# Patient Record
Sex: MALE | Race: BLACK OR AFRICAN AMERICAN
[De-identification: names, ages, dates, MRNs, and addresses within clinical notes are randomized per-mention and may not be internally consistent; named-entity substitution may affect disease eponyms.]

---

## 2017-02-01 ENCOUNTER — HOSPITAL ENCOUNTER (EMERGENCY)
Dept: HOSPITAL 47 - EC | Age: 22
Discharge: HOME | End: 2017-02-01
Payer: COMMERCIAL

## 2017-02-01 VITALS
RESPIRATION RATE: 18 BRPM | HEART RATE: 60 BPM | DIASTOLIC BLOOD PRESSURE: 64 MMHG | SYSTOLIC BLOOD PRESSURE: 110 MMHG | TEMPERATURE: 97.4 F

## 2017-02-01 DIAGNOSIS — T42.6X1A: Primary | ICD-10-CM

## 2017-02-01 LAB
ALP SERPL-CCNC: 64 U/L (ref 38–126)
ALT SERPL-CCNC: 42 U/L (ref 21–72)
ANION GAP SERPL CALC-SCNC: 11 MMOL/L
APAP SPEC-MCNC: <10 UG/ML
AST SERPL-CCNC: 29 U/L (ref 17–59)
BASOPHILS # BLD AUTO: 0 K/UL (ref 0–0.2)
BASOPHILS NFR BLD AUTO: 1 %
BUN SERPL-SCNC: 10 MG/DL (ref 9–20)
CALCIUM SPEC-MCNC: 9.3 MG/DL (ref 8.4–10.2)
CH: 29.9
CHCM: 34.2
CHLORIDE SERPL-SCNC: 106 MMOL/L (ref 98–107)
CK SERPL-CCNC: 165 U/L (ref 55–170)
CO2 SERPL-SCNC: 22 MMOL/L (ref 22–30)
EOSINOPHIL # BLD AUTO: 0 K/UL (ref 0–0.7)
EOSINOPHIL NFR BLD AUTO: 0 %
ERYTHROCYTE [DISTWIDTH] IN BLOOD BY AUTOMATED COUNT: 4.87 M/UL (ref 4.3–5.9)
ERYTHROCYTE [DISTWIDTH] IN BLOOD: 13.2 % (ref 11.5–15.5)
GLUCOSE SERPL-MCNC: 93 MG/DL (ref 74–99)
HCT VFR BLD AUTO: 42.7 % (ref 39–53)
HDW: 2.6
HGB BLD-MCNC: 14.2 GM/DL (ref 13–17.5)
LUC NFR BLD AUTO: 3 %
LYMPHOCYTES # SPEC AUTO: 1.1 K/UL (ref 1–4.8)
LYMPHOCYTES NFR SPEC AUTO: 16 %
MCH RBC QN AUTO: 29.2 PG (ref 25–35)
MCHC RBC AUTO-ENTMCNC: 33.3 G/DL (ref 31–37)
MCV RBC AUTO: 87.7 FL (ref 80–100)
MONOCYTES # BLD AUTO: 0.5 K/UL (ref 0–1)
MONOCYTES NFR BLD AUTO: 7 %
NEUTROPHILS # BLD AUTO: 5.3 K/UL (ref 1.3–7.7)
NEUTROPHILS NFR BLD AUTO: 74 %
NON-AFRICAN AMERICAN GFR(MDRD): >60
POTASSIUM SERPL-SCNC: 4.5 MMOL/L (ref 3.5–5.1)
PROT SERPL-MCNC: 7.4 G/DL (ref 6.3–8.2)
SALICYLATES SERPL-MCNC: <1 MG/DL
SODIUM SERPL-SCNC: 139 MMOL/L (ref 137–145)
TROPONIN I SERPL-MCNC: <0.012 NG/ML (ref 0–0.03)
WBC # BLD AUTO: 0.24 10*3/UL
WBC # BLD AUTO: 7.2 K/UL (ref 4–11)
WBC (PEROX): 7.19

## 2017-02-01 PROCEDURE — 85025 COMPLETE CBC W/AUTO DIFF WBC: CPT

## 2017-02-01 PROCEDURE — 82075 ASSAY OF BREATH ETHANOL: CPT

## 2017-02-01 PROCEDURE — 84484 ASSAY OF TROPONIN QUANT: CPT

## 2017-02-01 PROCEDURE — 99285 EMERGENCY DEPT VISIT HI MDM: CPT

## 2017-02-01 PROCEDURE — 36415 COLL VENOUS BLD VENIPUNCTURE: CPT

## 2017-02-01 PROCEDURE — 93005 ELECTROCARDIOGRAM TRACING: CPT

## 2017-02-01 PROCEDURE — 80053 COMPREHEN METABOLIC PANEL: CPT

## 2017-02-01 PROCEDURE — 83520 IMMUNOASSAY QUANT NOS NONAB: CPT

## 2017-02-01 PROCEDURE — 96360 HYDRATION IV INFUSION INIT: CPT

## 2017-02-01 PROCEDURE — 80306 DRUG TEST PRSMV INSTRMNT: CPT

## 2017-02-01 PROCEDURE — 82553 CREATINE MB FRACTION: CPT

## 2017-02-01 PROCEDURE — 82550 ASSAY OF CK (CPK): CPT

## 2017-02-01 NOTE — ED
General Adult HPI





- General


Stated complaint: unresponsive


Time Seen by Provider: 02/01/17 02:25


Source: RN notes reviewed





- History of Present Illness


Initial comments: 





This is a 21-year-old male who is brought in by EMS with police accompaniment.  

Patient's mother states she brought him home from a friend's house he was on a 

couch unresponsive.  When EMS arrived the patient's pupils were pinpoint and it 

took 8 mg of Narcan to wake the patient up.  Patient states she only took 2 

gabapentin and he denies any narcotic use.  Patient denies any headache patient 

denies numbness weakness.  Patient denies any chest pain difficulty breathing 

or shortness of breath.  Patient denies abdominal pain patient denies nausea 

vomiting diarrhea.  Patient denies any alcohol patient denies any drug use.  

Patient denies any recent injury or trauma.





- Related Data


 Home Medications











 Medication  Instructions  Recorded  Confirmed


 


No Known Home Medications [No  02/01/17 02/01/17





Known Home Medications]   











 Allergies











Allergy/AdvReac Type Severity Reaction Status Date / Time


 


No Known Allergies Allergy   Verified 02/01/17 02:36














Review of Systems


ROS Statement: 


Those systems with pertinent positive or pertinent negative responses have been 

documented in the HPI.





ROS Other: All systems not noted in ROS Statement are negative.





Past Medical History


Past Medical History: No Reported History


History of Any Multi-Drug Resistant Organisms: None Reported


Past Surgical History: Appendectomy


Past Psychological History: ADD/ADHD


Smoking Status: Never smoker


Past Alcohol Use History: None Reported


Past Drug Use History: None Reported





General Exam





- General Exam Comments


Initial Comments: 





GENERAL:


Patient is well-developed and well-nourished.  Patient is nontoxic and well-

hydrated and is in no acute distress.  Patient is slow to answer questions but 

he is able to answer all questions and his voice is clear





ENT:


Neck is soft and supple.  No significant lymphadenopathy is noted.  Oropharynx 

is clear.  Moist mucous membranes.  Neck has full range of motion without 

eliciting any pain. 





EYES:


The sclera were anicteric and conjunctiva were pink and moist.  Extraocular 

movements were intact and pupils were equal round and reactive to light.  

Eyelids were unremarkable.





PULMONARY:


Unlabored respirations.  Good breath sounds bilaterally.  No audible rales 

rhonchi or wheezing was noted.





CARDIOVASCULAR:


There is a regular rate and rhythm without any murmurs gallops or rubs.  





ABDOMEN:


Soft and nontender with normal bowel sounds.  No palpable organomegaly was 

noted.  There is no palpable pulsatile mass.





SKIN:


Skin is clear with no lesions or rashes and otherwise unremarkable.





NEUROLOGIC:


Patient is alert and oriented x3.  Cranial nerves II through XII are grossly 

intact.  Motor and sensory are also intact.  Normal speech, volume and content.

  Symmetrical smile. 





MUSCULOSKELETAL:


Normal extremities with adequate strength and full range of motion. .





LYMPHATICS:


No significant lymphadenopathy is noted





PSYCHIATRIC:


Normal psychiatric evaluation.  





Course


 Vital Signs











  02/01/17 02/01/17 02/01/17





  02:34 03:37 04:59


 


Temperature 98 F  


 


Pulse Rate 91 65 52 L


 


Respiratory 18 22 20





Rate   


 


Blood Pressure 151/92 145/89 107/51


 


O2 Sat by Pulse 98 96 96





Oximetry   














  02/01/17





  05:56


 


Temperature 97.4 F L


 


Pulse Rate 60


 


Respiratory 18





Rate 


 


Blood Pressure 110/64


 


O2 Sat by Pulse 96





Oximetry 














Medical Decision Making





- Medical Decision Making





EKG shows a sinus rhythm with a first-degree AV block at 74 bpm HI interval is 2

:30 QRS is 114 QT interval 384 QTC is 426 but patient has some ST segment 

elevation in leads 2 and aVF as well as precordial leads V2 through V6 all of 

which could be repolarization but because he cannot be 100% sure we will do 

some cardiac enzymes





Patient's friends came to the bedside and were stating that they all were 

smoking marijuana no one else had this affect.  They state that he has been 

known to take a few extra gabapentin to try to get high when I stated that he 

only took 2 they did not believe that they thought he probably took a few more 

than that.  They state that he was in a good mood and he never mentioned 

anything about wanting to harm himself or wanting to commit suicide patient 

denied that earlier when I spoke with.





Patient woke up he was alert and oriented 3 he stated he did take a few 

Neurontin.  Patient denies any other drug use.  Family came to pick the patient





- Lab Data


Result diagrams: 


 02/01/17 02:45





 02/01/17 02:45


 Lab Results











  02/01/17 02/01/17 02/01/17 Range/Units





  02:45 02:45 02:45 


 


WBC    7.2  (4.0-11.0)  k/uL


 


RBC    4.87  (4.30-5.90)  m/uL


 


Hgb    14.2  (13.0-17.5)  gm/dL


 


Hct    42.7  (39.0-53.0)  %


 


MCV    87.7  (80.0-100.0)  fL


 


MCH    29.2  (25.0-35.0)  pg


 


MCHC    33.3  (31.0-37.0)  g/dL


 


RDW    13.2  (11.5-15.5)  %


 


Plt Count    205  (150-450)  k/uL


 


Neutrophils %    74  %


 


Lymphocytes %    16  %


 


Monocytes %    7  %


 


Eosinophils %    0  %


 


Basophils %    1  %


 


Neutrophils #    5.3  (1.3-7.7)  k/uL


 


Lymphocytes #    1.1  (1.0-4.8)  k/uL


 


Monocytes #    0.5  (0-1.0)  k/uL


 


Eosinophils #    0.0  (0-0.7)  k/uL


 


Basophils #    0.0  (0-0.2)  k/uL


 


Sodium  139    (137-145)  mmol/L


 


Potassium  4.5    (3.5-5.1)  mmol/L


 


Chloride  106    ()  mmol/L


 


Carbon Dioxide  22    (22-30)  mmol/L


 


Anion Gap  11    mmol/L


 


BUN  10    (9-20)  mg/dL


 


Creatinine  0.80    (0.66-1.25)  mg/dL


 


Est GFR (MDRD) Af Amer  >60    (>60 ml/min/1.73 sqM)  


 


Est GFR (MDRD) Non-Af  >60    (>60 ml/min/1.73 sqM)  


 


Glucose  93    (74-99)  mg/dL


 


Calcium  9.3    (8.4-10.2)  mg/dL


 


Total Bilirubin  0.5    (0.2-1.3)  mg/dL


 


AST  29    (17-59)  U/L


 


ALT  42    (21-72)  U/L


 


Alkaline Phosphatase  64    ()  U/L


 


Total Creatine Kinase   165   ()  U/L


 


CK-MB (CK-2)   0.3   (0.0-2.4)  ng/mL


 


CK-MB (CK-2) Rel Index   0.2   


 


Troponin I   <0.012   (0.000-0.034)  ng/mL


 


Total Protein  7.4    (6.3-8.2)  g/dL


 


Albumin  4.4    (3.5-5.0)  g/dL


 


Salicylates  <1.0    mg/dL


 


Urine Opiates Screen     (NotDetected)  


 


Ur Oxycodone Screen     (NotDetected)  


 


Urine Methadone Screen     (NotDetected)  


 


Ur Propoxyphene Screen     (NotDetected)  


 


Acetaminophen  <10.0    ug/mL


 


Ur Barbiturates Screen     (NotDetected)  


 


U Tricyclic Antidepress     (NotDetected)  


 


Ur Phencyclidine Scrn     (NotDetected)  


 


Ur Amphetamines Screen     (NotDetected)  


 


U Methamphetamines Scrn     (NotDetected)  


 


U Benzodiazepines Scrn     (NotDetected)  


 


Urine Cocaine Screen     (NotDetected)  


 


U Marijuana (THC) Screen     (NotDetected)  














  02/01/17 Range/Units





  02:45 


 


WBC   (4.0-11.0)  k/uL


 


RBC   (4.30-5.90)  m/uL


 


Hgb   (13.0-17.5)  gm/dL


 


Hct   (39.0-53.0)  %


 


MCV   (80.0-100.0)  fL


 


MCH   (25.0-35.0)  pg


 


MCHC   (31.0-37.0)  g/dL


 


RDW   (11.5-15.5)  %


 


Plt Count   (150-450)  k/uL


 


Neutrophils %   %


 


Lymphocytes %   %


 


Monocytes %   %


 


Eosinophils %   %


 


Basophils %   %


 


Neutrophils #   (1.3-7.7)  k/uL


 


Lymphocytes #   (1.0-4.8)  k/uL


 


Monocytes #   (0-1.0)  k/uL


 


Eosinophils #   (0-0.7)  k/uL


 


Basophils #   (0-0.2)  k/uL


 


Sodium   (137-145)  mmol/L


 


Potassium   (3.5-5.1)  mmol/L


 


Chloride   ()  mmol/L


 


Carbon Dioxide   (22-30)  mmol/L


 


Anion Gap   mmol/L


 


BUN   (9-20)  mg/dL


 


Creatinine   (0.66-1.25)  mg/dL


 


Est GFR (MDRD) Af Amer   (>60 ml/min/1.73 sqM)  


 


Est GFR (MDRD) Non-Af   (>60 ml/min/1.73 sqM)  


 


Glucose   (74-99)  mg/dL


 


Calcium   (8.4-10.2)  mg/dL


 


Total Bilirubin   (0.2-1.3)  mg/dL


 


AST   (17-59)  U/L


 


ALT   (21-72)  U/L


 


Alkaline Phosphatase   ()  U/L


 


Total Creatine Kinase   ()  U/L


 


CK-MB (CK-2)   (0.0-2.4)  ng/mL


 


CK-MB (CK-2) Rel Index   


 


Troponin I   (0.000-0.034)  ng/mL


 


Total Protein   (6.3-8.2)  g/dL


 


Albumin   (3.5-5.0)  g/dL


 


Salicylates   mg/dL


 


Urine Opiates Screen  Not Detected  (NotDetected)  


 


Ur Oxycodone Screen  Not Detected  (NotDetected)  


 


Urine Methadone Screen  Not Detected  (NotDetected)  


 


Ur Propoxyphene Screen  Not Detected  (NotDetected)  


 


Acetaminophen   ug/mL


 


Ur Barbiturates Screen  Not Detected  (NotDetected)  


 


U Tricyclic Antidepress  Not Detected  (NotDetected)  


 


Ur Phencyclidine Scrn  Not Detected  (NotDetected)  


 


Ur Amphetamines Screen  Not Detected  (NotDetected)  


 


U Methamphetamines Scrn  Not Detected  (NotDetected)  


 


U Benzodiazepines Scrn  Not Detected  (NotDetected)  


 


Urine Cocaine Screen  Not Detected  (NotDetected)  


 


U Marijuana (THC) Screen  Detected H  (NotDetected)  














Disposition


Clinical Impression: 


 Gabapentin overdose





Disposition: HOME SELF-CARE


Condition: Good


Instructions:  Adult Overdose (ED)


Referrals: 


Edward Ambrose MD [Primary Care Provider] - 1-2 days


Time of Disposition: 06:09

## 2020-01-21 ENCOUNTER — HOSPITAL ENCOUNTER (EMERGENCY)
Dept: HOSPITAL 47 - EC | Age: 25
Discharge: HOME | End: 2020-01-21
Payer: COMMERCIAL

## 2020-01-21 VITALS
DIASTOLIC BLOOD PRESSURE: 74 MMHG | SYSTOLIC BLOOD PRESSURE: 138 MMHG | HEART RATE: 68 BPM | TEMPERATURE: 99.7 F | RESPIRATION RATE: 19 BRPM

## 2020-01-21 DIAGNOSIS — J40: Primary | ICD-10-CM

## 2020-01-21 DIAGNOSIS — F17.200: ICD-10-CM

## 2020-01-21 DIAGNOSIS — J06.9: ICD-10-CM

## 2020-01-21 PROCEDURE — 87502 INFLUENZA DNA AMP PROBE: CPT

## 2020-01-21 PROCEDURE — 99285 EMERGENCY DEPT VISIT HI MDM: CPT

## 2020-01-21 PROCEDURE — 71046 X-RAY EXAM CHEST 2 VIEWS: CPT

## 2020-01-21 NOTE — ED
URI HPI





- General


Chief Complaint: Upper Respiratory Infection


Stated Complaint: ANGELY


Time Seen by Provider: 01/21/20 14:09


Source: patient, RN notes reviewed


Mode of arrival: ambulatory


Limitations: no limitations





- History of Present Illness


Initial Comments: 


24-year-old male presents emergency Department with chief complaint of fever 

cough congestion.  Patient has been sick last few days.  Patient states she is 

achy had a telemetry complaint of headache, congestion nonproductive cough.  He 

has no history of asthma or COPD.  Denies any nausea vomiting diarrhea 

constipation no sick contacts.  Patient has NO KNOWN DRUG ALLERGIES.








- Related Data


                                  Previous Rx's











 Medication  Instructions  Recorded


 


Azithromycin [Zithromax Z-pack] 0 mg PO AS DIRECTED #1 pack 01/21/20











                                    Allergies











Allergy/AdvReac Type Severity Reaction Status Date / Time


 


No Known Allergies Allergy   Verified 02/01/17 02:36














Review of Systems


ROS Statement: 


Those systems with pertinent positive or pertinent negative responses have been 

documented in the HPI.





ROS Other: All systems not noted in ROS Statement are negative.





Past Medical History


Past Medical History: No Reported History


History of Any Multi-Drug Resistant Organisms: None Reported


Past Surgical History: Appendectomy


Past Psychological History: ADD/ADHD


Smoking Status: Current every day smoker


Past Alcohol Use History: Occasional


Past Drug Use History: None Reported





General Exam


Limitations: no limitations


General appearance: alert, in no apparent distress


Head exam: Present: atraumatic, normocephalic, normal inspection


Eye exam: Present: normal appearance, PERRL, EOMI.  Absent: scleral icterus, 

conjunctival injection, periorbital swelling


ENT exam: Present: normal exam, mucous membranes moist


Neck exam: Present: normal inspection, full ROM.  Absent: tenderness, 

meningismus, lymphadenopathy


Respiratory exam: Present: normal lung sounds bilaterally.  Absent: respiratory 

distress, wheezes, rales, rhonchi, stridor


Cardiovascular Exam: Present: regular rate, normal rhythm, normal heart sounds. 

Absent: systolic murmur, diastolic murmur, rubs, gallop, clicks


GI/Abdominal exam: Present: soft, normal bowel sounds.  Absent: distended, 

tenderness, guarding, rebound, rigid


Neurological exam: Present: alert, oriented X3, CN II-XII intact


Skin exam: Present: warm, dry, intact, normal color.  Absent: rash





Course


                                   Vital Signs











  01/21/20





  13:49


 


Temperature 99.7 F H


 


Pulse Rate 68


 


Respiratory 19





Rate 


 


Blood Pressure 138/74


 


O2 Sat by Pulse 97





Oximetry 














Medical Decision Making





- Medical Decision Making





X-rays unremarkable, influenza is negative at this time this may be viral 

nature, though there is concerns for possible early pneumonia.  Patient was 

placed on antibiotics.  Return parameters were discussed.





- Lab Data


                                   Lab Results











  01/21/20 Range/Units





  14:25 


 


Influenza Type A RNA  Not Detected  (Not Detectd)  


 


Influenza Type B (PCR)  Not Detected  (Not Detectd)  














Disposition


Clinical Impression: 


 Acute upper respiratory infection, Bronchitis





Disposition: HOME SELF-CARE


Condition: Stable


Instructions (If sedation given, give patient instructions):  Upper Respiratory 

Infection (ED)


Additional Instructions: 


Please return to the Emergency Department if symptoms worsen or any other 

concerns.


Prescriptions: 


Azithromycin [Zithromax Z-pack] 0 mg PO AS DIRECTED #1 pack


Is patient prescribed a controlled substance at d/c from ED?: No


Referrals: 


None,Stated [Primary Care Provider] - 1-2 days


Time of Disposition: 14:58

## 2020-01-21 NOTE — XR
EXAMINATION TYPE: XR chest 2V

 

DATE OF EXAM: 1/21/2020

 

COMPARISON: NONE

 

TECHNIQUE: PA and lateral views submitted.

 

HISTORY: Fever

 

FINDINGS:

The lungs are clear and  there is no pneumothorax, pleural effusion, or focal pneumonia.  Heart size 
normal and no overt failure.

 

IMPRESSION: 

1. No acute process.

## 2021-12-24 ENCOUNTER — HOSPITAL ENCOUNTER (EMERGENCY)
Dept: HOSPITAL 47 - EC | Age: 26
Discharge: HOME | End: 2021-12-24
Payer: COMMERCIAL

## 2021-12-24 VITALS
HEART RATE: 124 BPM | DIASTOLIC BLOOD PRESSURE: 110 MMHG | TEMPERATURE: 97.9 F | RESPIRATION RATE: 18 BRPM | SYSTOLIC BLOOD PRESSURE: 157 MMHG

## 2021-12-24 DIAGNOSIS — S61.412A: Primary | ICD-10-CM
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
## 2021-12-24 PROCEDURE — 99282 EMERGENCY DEPT VISIT SF MDM: CPT

## 2021-12-24 RX ADMIN — TETANUS TOXOID, REDUCED DIPHTHERIA TOXOID AND ACELLULAR PERTUSSIS VACCINE, ADSORBED ONE: 5; 2.5; 8; 8; 2.5 SUSPENSION INTRAMUSCULAR at 01:35

## 2021-12-24 RX ADMIN — GELATIN ABSORBABLE SPONGE SIZE 100 STA EACH: MISC at 02:17
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
## 2022-02-28 ENCOUNTER — HOSPITAL ENCOUNTER (EMERGENCY)
Dept: HOSPITAL 47 - EC | Age: 27
Discharge: HOME | End: 2022-02-28
Payer: COMMERCIAL

## 2022-02-28 VITALS — TEMPERATURE: 97.8 F

## 2022-02-28 VITALS — SYSTOLIC BLOOD PRESSURE: 138 MMHG | DIASTOLIC BLOOD PRESSURE: 88 MMHG | RESPIRATION RATE: 24 BRPM | HEART RATE: 79 BPM

## 2022-02-28 DIAGNOSIS — F90.9: ICD-10-CM
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
## 2022-02-28 DIAGNOSIS — S60.221A: Primary | ICD-10-CM

## 2022-02-28 DIAGNOSIS — Z90.49: ICD-10-CM

## 2022-02-28 PROCEDURE — 99283 EMERGENCY DEPT VISIT LOW MDM: CPT

## 2022-02-28 NOTE — XR
EXAMINATION TYPE: XR hand complete RT

 

DATE OF EXAM: 2/28/2022

 

COMPARISON: NONE

 

HISTORY: Pain

 

TECHNIQUE: 3 views

 

FINDINGS: There is soft tissue swelling on the dorsum of the hand. The metacarpals are intact. I see 
no fracture nor dislocation. The fingers are intact.

 

IMPRESSION: Soft tissue swelling. No fracture.
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
## 2022-07-01 ENCOUNTER — HOSPITAL ENCOUNTER (OUTPATIENT)
Dept: HOSPITAL 47 - ORWHC2ENDO | Age: 27
Discharge: HOME | End: 2022-07-01
Attending: INTERNAL MEDICINE
Payer: COMMERCIAL

## 2022-07-01 VITALS — RESPIRATION RATE: 16 BRPM | TEMPERATURE: 98.1 F

## 2022-07-01 VITALS — SYSTOLIC BLOOD PRESSURE: 119 MMHG | DIASTOLIC BLOOD PRESSURE: 73 MMHG | HEART RATE: 59 BPM

## 2022-07-01 DIAGNOSIS — I25.10: ICD-10-CM

## 2022-07-01 DIAGNOSIS — K21.9: ICD-10-CM

## 2022-07-01 DIAGNOSIS — K29.50: Primary | ICD-10-CM

## 2022-07-01 DIAGNOSIS — Z79.899: ICD-10-CM

## 2022-07-01 PROCEDURE — 43239 EGD BIOPSY SINGLE/MULTIPLE: CPT

## 2022-07-01 PROCEDURE — 88305 TISSUE EXAM BY PATHOLOGIST: CPT

## 2022-07-01 PROCEDURE — 45378 DIAGNOSTIC COLONOSCOPY: CPT

## 2022-07-01 NOTE — P.PCN
Date of Procedure: 07/01/22


Procedure(s) Performed: 


Brief history:


Patient is a pleasant 26-year-old white female scheduled for an elective upper 

endoscopy as well as colonoscopy as a part of evaluation of intermittent 

episodes of nausea vomiting and rectal bleeding for the last 3-4 years duration.





Procedure performed:


Esophagogastroduodenoscopy with biopsy


Colonoscopy





Preoperative diagnosis:


Chronic intermittent nausea vomiting


Rectal bleeding





Anesthesia: MAC





Procedure:


After informed consent was obtained from the patient  was brought into the 

endoscopy unit and IV  sedation was administered by anesthesia under continuous 

monitoring.  Initially upper endoscopy was done.  The Olympus  video 

endoscope was inserted inserted into the mouth and esophagus intubated without 

any difficulty and was gradually advanced into the stomach and duodenum and 

carefully examined.  The bulb and second part of the duodenum appeared normal.  

Abscesses were done from the duodenum to rule out celiac disease.  The scope was

then withdrawn into the stomach adequately insufflated with air and upon careful

examination  the antrum had mild gastritis and biopsies were done from this 

area.  The body, cardia and fundus appeared normal.  The scope was then 

withdrawn into the esophagus.  The GE junction was located at 40 cm to the 

incisors.  It appeared regular with no erythema erosions or ulcerations.  Rest 

of the esophagus appeared normal.  Abscesses were done from the distal 

esophagus.  Patient tolerated the procedure well.





At this time the patient continued to remain sedation.  Initial digital rectal 

examination was normal.  Olympus  video colonoscope was then inserted into

the rectum and gradually advanced to the cecum without any difficulty.  Careful 

examination was performed as the scope was gradually being withdrawn.  The prep 

was excellent.  The cecum, ascending colon, transverse colon, descending colon, 

sigmoid colon and rectum appeared normal.  Retroflexion was performed in the 

rectum and no lesions were noted.  Patient tolerated the procedure well.





Impression:


1.  Upper endoscopy revealed mild antral gastritis but no evidence of 

esophagitis or peptic ulcer disease


2.  Colonoscopy was within normal limits with no evidence of colorectal 

neoplasia








Recommendations:


Findings of this examination were discussed with the patient as well as his 

family.  He was advised to follow with the biopsy results.  He will continue 

with omeprazole 20 mg twice daily and Zofran as needed and follow antireflux 

measures.